# Patient Record
Sex: FEMALE | Race: ASIAN | NOT HISPANIC OR LATINO | Employment: FULL TIME | URBAN - METROPOLITAN AREA
[De-identification: names, ages, dates, MRNs, and addresses within clinical notes are randomized per-mention and may not be internally consistent; named-entity substitution may affect disease eponyms.]

---

## 2022-09-01 ENCOUNTER — APPOINTMENT (OUTPATIENT)
Dept: RADIOLOGY | Facility: MEDICAL CENTER | Age: 47
End: 2022-09-01
Attending: EMERGENCY MEDICINE
Payer: COMMERCIAL

## 2022-09-01 ENCOUNTER — HOSPITAL ENCOUNTER (EMERGENCY)
Facility: MEDICAL CENTER | Age: 47
End: 2022-09-01
Attending: EMERGENCY MEDICINE
Payer: COMMERCIAL

## 2022-09-01 VITALS
BODY MASS INDEX: 19.35 KG/M2 | DIASTOLIC BLOOD PRESSURE: 84 MMHG | RESPIRATION RATE: 16 BRPM | OXYGEN SATURATION: 97 % | SYSTOLIC BLOOD PRESSURE: 118 MMHG | HEIGHT: 61 IN | WEIGHT: 102.51 LBS | TEMPERATURE: 98.1 F | HEART RATE: 80 BPM

## 2022-09-01 DIAGNOSIS — R06.00 DYSPNEA, UNSPECIFIED TYPE: ICD-10-CM

## 2022-09-01 DIAGNOSIS — R00.2 PALPITATIONS: ICD-10-CM

## 2022-09-01 LAB
ALBUMIN SERPL BCP-MCNC: 4.3 G/DL (ref 3.2–4.9)
ALBUMIN/GLOB SERPL: 1.6 G/DL
ALP SERPL-CCNC: 93 U/L (ref 30–99)
ALT SERPL-CCNC: 24 U/L (ref 2–50)
ANION GAP SERPL CALC-SCNC: 10 MMOL/L (ref 7–16)
AST SERPL-CCNC: 22 U/L (ref 12–45)
BASOPHILS # BLD AUTO: 1.2 % (ref 0–1.8)
BASOPHILS # BLD: 0.05 K/UL (ref 0–0.12)
BILIRUB SERPL-MCNC: 0.4 MG/DL (ref 0.1–1.5)
BUN SERPL-MCNC: 11 MG/DL (ref 8–22)
CALCIUM SERPL-MCNC: 9.1 MG/DL (ref 8.4–10.2)
CHLORIDE SERPL-SCNC: 106 MMOL/L (ref 96–112)
CO2 SERPL-SCNC: 24 MMOL/L (ref 20–33)
CREAT SERPL-MCNC: 0.58 MG/DL (ref 0.5–1.4)
D DIMER PPP IA.FEU-MCNC: 3.68 UG/ML (FEU) (ref 0–0.5)
EKG IMPRESSION: NORMAL
EOSINOPHIL # BLD AUTO: 0.26 K/UL (ref 0–0.51)
EOSINOPHIL NFR BLD: 6.2 % (ref 0–6.9)
ERYTHROCYTE [DISTWIDTH] IN BLOOD BY AUTOMATED COUNT: 43.4 FL (ref 35.9–50)
GFR SERPLBLD CREATININE-BSD FMLA CKD-EPI: 111 ML/MIN/1.73 M 2
GLOBULIN SER CALC-MCNC: 2.7 G/DL (ref 1.9–3.5)
GLUCOSE SERPL-MCNC: 106 MG/DL (ref 65–99)
HCT VFR BLD AUTO: 33.3 % (ref 37–47)
HGB BLD-MCNC: 10.7 G/DL (ref 12–16)
IMM GRANULOCYTES # BLD AUTO: 0.02 K/UL (ref 0–0.11)
IMM GRANULOCYTES NFR BLD AUTO: 0.5 % (ref 0–0.9)
LYMPHOCYTES # BLD AUTO: 0.87 K/UL (ref 1–4.8)
LYMPHOCYTES NFR BLD: 20.7 % (ref 22–41)
MCH RBC QN AUTO: 27.2 PG (ref 27–33)
MCHC RBC AUTO-ENTMCNC: 32.1 G/DL (ref 33.6–35)
MCV RBC AUTO: 84.5 FL (ref 81.4–97.8)
MONOCYTES # BLD AUTO: 0.37 K/UL (ref 0–0.85)
MONOCYTES NFR BLD AUTO: 8.8 % (ref 0–13.4)
NEUTROPHILS # BLD AUTO: 2.63 K/UL (ref 2–7.15)
NEUTROPHILS NFR BLD: 62.6 % (ref 44–72)
NRBC # BLD AUTO: 0 K/UL
NRBC BLD-RTO: 0 /100 WBC
PLATELET # BLD AUTO: 337 K/UL (ref 164–446)
PMV BLD AUTO: 8.8 FL (ref 9–12.9)
POTASSIUM SERPL-SCNC: 3.7 MMOL/L (ref 3.6–5.5)
PROT SERPL-MCNC: 7 G/DL (ref 6–8.2)
RBC # BLD AUTO: 3.94 M/UL (ref 4.2–5.4)
SODIUM SERPL-SCNC: 140 MMOL/L (ref 135–145)
TROPONIN T SERPL-MCNC: 9 NG/L (ref 6–19)
WBC # BLD AUTO: 4.2 K/UL (ref 4.8–10.8)

## 2022-09-01 PROCEDURE — 71045 X-RAY EXAM CHEST 1 VIEW: CPT

## 2022-09-01 PROCEDURE — 84484 ASSAY OF TROPONIN QUANT: CPT

## 2022-09-01 PROCEDURE — 700117 HCHG RX CONTRAST REV CODE 255: Performed by: EMERGENCY MEDICINE

## 2022-09-01 PROCEDURE — 36415 COLL VENOUS BLD VENIPUNCTURE: CPT

## 2022-09-01 PROCEDURE — 99284 EMERGENCY DEPT VISIT MOD MDM: CPT

## 2022-09-01 PROCEDURE — 94760 N-INVAS EAR/PLS OXIMETRY 1: CPT

## 2022-09-01 PROCEDURE — 80053 COMPREHEN METABOLIC PANEL: CPT

## 2022-09-01 PROCEDURE — 85379 FIBRIN DEGRADATION QUANT: CPT

## 2022-09-01 PROCEDURE — 71275 CT ANGIOGRAPHY CHEST: CPT

## 2022-09-01 PROCEDURE — 93005 ELECTROCARDIOGRAM TRACING: CPT

## 2022-09-01 PROCEDURE — 93005 ELECTROCARDIOGRAM TRACING: CPT | Performed by: EMERGENCY MEDICINE

## 2022-09-01 PROCEDURE — 85025 COMPLETE CBC W/AUTO DIFF WBC: CPT

## 2022-09-01 RX ORDER — CEPHALEXIN 250 MG/1
250 CAPSULE ORAL EVERY 6 HOURS
COMMUNITY

## 2022-09-01 RX ORDER — HYDROCODONE BITARTRATE AND ACETAMINOPHEN 10; 325 MG/1; MG/1
1 TABLET ORAL EVERY 6 HOURS PRN
COMMUNITY
Start: 2022-08-15

## 2022-09-01 RX ORDER — DOCUSATE SODIUM 100 MG/1
100 CAPSULE, LIQUID FILLED ORAL 2 TIMES DAILY
COMMUNITY

## 2022-09-01 RX ORDER — LORAZEPAM 1 MG/1
1 TABLET ORAL
COMMUNITY
Start: 2022-08-15

## 2022-09-01 RX ORDER — ACETAMINOPHEN 500 MG
500-1000 TABLET ORAL EVERY 6 HOURS PRN
COMMUNITY

## 2022-09-01 RX ADMIN — IOHEXOL 65 ML: 350 INJECTION, SOLUTION INTRAVENOUS at 13:46

## 2022-09-01 NOTE — ED NOTES
Med rec completed per pt   Allergies reviewed    Pt completed a 4 day course of Keflex about 1 week ago

## 2022-09-01 NOTE — DISCHARGE INSTRUCTIONS
Follow-up with primary care and surgeon 1 to 2 days for reevaluation.    Continue home medications as previously indicated.    Encourage oral fluid hydration.  Diet as tolerated.    Activities recommended postoperatively.    Return to the emergency department for recurrent or intractable chest pain, shortness of breath, rapid heartbeat, syncope, fever, wound infection or other new concerns.

## 2022-09-01 NOTE — ED PROVIDER NOTES
ED Provider Note    CHIEF COMPLAINT  Chief Complaint   Patient presents with    Shortness of Breath    Anxiety       HPI  Saida Landon is a 46 y.o. female who presents to the emergency department through triage with her  for shortness of breath, tachycardia.  Patient states she had VASER liposuction approximately 10 days ago in [place].  Has been recovering well.  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  Worked overnight last night, arrived home this morning and took off her compressive dressing to wash it.  She felt a warmness developed in her stomach and chest, then felt short of breath. Palpitations, noticed her heart rate was 140.  No cough or congestion.  No pleuritic pain.  No fever or chills.  No evidence for wound infection.    Initially with 3 CANDY drains, now down to 1.  Had follow-up with a doctor's office 2 days ago.    Patient is unsure if she had anxiety attack, but does not have history of the same.  Symptoms are improving upon arrival but not resolved.  No extremity swelling or discoloration or discomfort.    REVIEW OF SYSTEMS  See HPI for further details. All other systems are negative.     PAST MEDICAL HISTORY  Denies    SOCIAL HISTORY  Social History     Tobacco Use    Smoking status: Not on file    Smokeless tobacco: Not on file   Substance and Sexual Activity    Alcohol use: Not on file    Drug use: Not on file    Sexual activity: Not on file       SURGICAL HISTORY   has a past surgical history that includes liposuction.    CURRENT MEDICATIONS  Home Medications       Reviewed by Ran Fabian (Pharmacy Tech) on 09/01/22 at 1223  Med List Status: Complete     Medication Last Dose Status   acetaminophen (TYLENOL) 500 MG Tab 9/1/2022 Active   cephALEXin (KEFLEX) 250 MG Cap COMPLETED Active   docusate sodium (COLACE) 100 MG Cap 8/31/2022 Active   HYDROcodone/acetaminophen (NORCO)  MG Tab 8/30/2022 Active   LORazepam (ATIVAN) 1 MG Tab 9/1/2022 Active  "                   ALLERGIES  Allergies   Allergen Reactions    Fish Hives     Not allergic to shell fish        PHYSICAL EXAM  VITAL SIGNS: /84   Pulse 80   Temp 36.7 °C (98.1 °F) (Temporal)   Resp 16   Ht 1.549 m (5' 1\")   Wt 46.5 kg (102 lb 8.2 oz)   SpO2 97%   BMI 19.37 kg/m²   Pulse ox interpretation: I interpret this pulse ox as normal.  Constitutional: Alert in no apparent distress.  HENT: Normocephalic, atraumatic. Bilateral external ears normal, Nose normal. Moist mucous membranes.    Eyes: Pupils are equal and reactive, Conjunctiva normal.   Neck: Normal range of motion, Supple.  No JVD.  No stridor or dysphonia.  Lymphatic: No lymphadenopathy noted.   Cardiovascular: Mild tachycardia otherwise regular rate and rhythm, no murmurs. Distal pulses intact.  No peripheral edema.  No unilateral pain, swelling or discoloration.  Thorax & Lungs: Normal breath sounds.  No wheezing/rales/ronchi. No increased work of breathing, clipped speech or retractions.   Abdomen: Soft, non-distended.  Small abdominal and groin wound incisions are clean and dry.  Left groin with CANDY drain with minimal output.  Subacute ecchymosis in the left upper quadrant.  Mild diffuse tenderness without guarding or peritonitis.  Skin: Warm, Dry, No erythema, No rash.   Musculoskeletal: Good range of motion in all major joints.   Neurologic: Alert , no gross focal deficit noted.  Psychiatric: Affect normal, Judgment normal, Mood normal.       DIAGNOSTIC STUDIES / PROCEDURES    LABS  Results for orders placed or performed during the hospital encounter of 09/01/22   CBC w/ Differential   Result Value Ref Range    WBC 4.2 (L) 4.8 - 10.8 K/uL    RBC 3.94 (L) 4.20 - 5.40 M/uL    Hemoglobin 10.7 (L) 12.0 - 16.0 g/dL    Hematocrit 33.3 (L) 37.0 - 47.0 %    MCV 84.5 81.4 - 97.8 fL    MCH 27.2 27.0 - 33.0 pg    MCHC 32.1 (L) 33.6 - 35.0 g/dL    RDW 43.4 35.9 - 50.0 fL    Platelet Count 337 164 - 446 K/uL    MPV 8.8 (L) 9.0 - 12.9 fL    " Neutrophils-Polys 62.60 44.00 - 72.00 %    Lymphocytes 20.70 (L) 22.00 - 41.00 %    Monocytes 8.80 0.00 - 13.40 %    Eosinophils 6.20 0.00 - 6.90 %    Basophils 1.20 0.00 - 1.80 %    Immature Granulocytes 0.50 0.00 - 0.90 %    Nucleated RBC 0.00 /100 WBC    Neutrophils (Absolute) 2.63 2.00 - 7.15 K/uL    Lymphs (Absolute) 0.87 (L) 1.00 - 4.80 K/uL    Monos (Absolute) 0.37 0.00 - 0.85 K/uL    Eos (Absolute) 0.26 0.00 - 0.51 K/uL    Baso (Absolute) 0.05 0.00 - 0.12 K/uL    Immature Granulocytes (abs) 0.02 0.00 - 0.11 K/uL    NRBC (Absolute) 0.00 K/uL   Complete Metabolic Panel (CMP)   Result Value Ref Range    Sodium 140 135 - 145 mmol/L    Potassium 3.7 3.6 - 5.5 mmol/L    Chloride 106 96 - 112 mmol/L    Co2 24 20 - 33 mmol/L    Anion Gap 10.0 7.0 - 16.0    Glucose 106 (H) 65 - 99 mg/dL    Bun 11 8 - 22 mg/dL    Creatinine 0.58 0.50 - 1.40 mg/dL    Calcium 9.1 8.4 - 10.2 mg/dL    AST(SGOT) 22 12 - 45 U/L    ALT(SGPT) 24 2 - 50 U/L    Alkaline Phosphatase 93 30 - 99 U/L    Total Bilirubin 0.4 0.1 - 1.5 mg/dL    Albumin 4.3 3.2 - 4.9 g/dL    Total Protein 7.0 6.0 - 8.2 g/dL    Globulin 2.7 1.9 - 3.5 g/dL    A-G Ratio 1.6 g/dL   Troponin STAT   Result Value Ref Range    Troponin T 9 6 - 19 ng/L   D-Dimer (only helpful in low pre-test probability wells critieria. Do not order if patient ruled out by PERC criteria. See Weblinks at top of Labs section)   Result Value Ref Range    D-Dimer Screen 3.68 (H) 0.00 - 0.50 ug/mL (FEU)   ESTIMATED GFR   Result Value Ref Range    GFR (CKD-EPI) 111 >60 mL/min/1.73 m 2   EKG   Result Value Ref Range    Report       [facility] Emergency Dept.    Test Date:  2022  Pt Name:    KAY JADEN        Department: [facility]  MRN:        [id number]                      Room:  Gender:     Female                       Technician: [id number]  :        1975                   Requested By:ER TRIAGE PROTOCOL  Order #:    [id number]                    Amado MD:  BASIM GOMEZ DO    Measurements  Intervals                                Axis  Rate:       86                           P:          77  IA:         160                          QRS:        78  QRSD:       83                           T:          26  QT:         362  QTc:        433    Interpretive Statements  Sinus rhythm  Borderline T abnormalities, anterior leads  No previous ECG available for comparison  Electronically Signed On 9-1-2022 13:19:54 PDT by BASIM GOMEZ DO           RADIOLOGY  CT-CTA CHEST PULMONARY ARTERY W/ RECONS   Final Result      No pulmonary embolus identified            DX-CHEST-PORTABLE (1 VIEW)   Final Result      No evidence of acute cardiopulmonary process.          COURSE & MEDICAL DECISION MAKING  Nursing notes and vital signs were reviewed. (See chart for details)  The patients  records were reviewed, history was obtained from the patient;     Seen evaluated at bedside.  Mild persistent tachycardia, 90s to 104 during my evaluation.  Normotensive.  No acute respiratory distress.  Lung sounds are clear to auscultation bilaterally.  No clinical sequela for DVT however patient is postoperative.  Add labs, include D-dimer, chest x-ray.    1:20 PM reevaluated at bedside.  No acute respiratory distress.  Symptoms have nearly resolved.  Heart rate 80s.  Normotensive.  No hypoxia.  Labs are within normal limits however D-dimer is elevated.  EKG within normal limits, no evidence for STEMI or other ischemia.  Continuous telemetry thus far without ectopy or arrhythmia.  Add CTA to exclude PE.    2:08 PM CT negative for PE or other acute pathology.  Patient is resting comfortably, symptoms have resolved.  Hemodynamically stable without persistent tachycardia, she was never febrile, hypotensive or hypoxic.  No acute respiratory distress.  Nonspecific dyspnea, palpitations resolved spontaneously.  She has a rest of the weekend off we will follow-up with her surgeon and primary care for  reevaluation.    Patient is stable for discharge at this time, anticipatory guidance provided, continue home medications as previously indicated, close follow-up is encouraged, and strict ED return instructions have been detailed. Patient is agreeable to the disposition and plan.    FINAL IMPRESSION  (R00.2) Palpitations  (R06.00) Dyspnea, unspecified type      Electronically signed by: Ayaka Ferrer D.O., 9/1/2022 1:17 PM      This dictation was created using voice recognition software. The accuracy of the dictation is limited to the abilities of the software. I expect there may be some errors of grammar and possibly content. The nursing notes were reviewed and certain aspects of this information were incorporated into this note.

## 2022-09-01 NOTE — ED TRIAGE NOTES
Pt amb to triage c/o sob and incr anxiety since yesterday. Pt had a liposuction proc to abd and gurinder flanks x4d ago

## 2022-09-01 NOTE — ED NOTES
Pt given d/c paperwork, pt verbalized understanding all information given. Pt ambulated out of the ER w/o difficulty

## 2024-05-22 ENCOUNTER — HOSPITAL ENCOUNTER (OUTPATIENT)
Facility: MEDICAL CENTER | Age: 49
End: 2024-05-22
Attending: INTERNAL MEDICINE
Payer: COMMERCIAL

## 2024-05-23 LAB
ESTRADIOL SERPL-MCNC: <5 PG/ML
FSH SERPL-ACNC: 118 MIU/ML

## 2024-11-26 ENCOUNTER — HOSPITAL ENCOUNTER (EMERGENCY)
Facility: MEDICAL CENTER | Age: 49
End: 2024-11-27
Attending: STUDENT IN AN ORGANIZED HEALTH CARE EDUCATION/TRAINING PROGRAM
Payer: COMMERCIAL

## 2024-11-26 DIAGNOSIS — R42 VERTIGO: Primary | ICD-10-CM

## 2024-11-26 DIAGNOSIS — G43.809 OTHER MIGRAINE WITHOUT STATUS MIGRAINOSUS, NOT INTRACTABLE: ICD-10-CM

## 2024-11-26 LAB
BASOPHILS # BLD AUTO: 0.5 % (ref 0–1.8)
BASOPHILS # BLD: 0.03 K/UL (ref 0–0.12)
EOSINOPHIL # BLD AUTO: 0.12 K/UL (ref 0–0.51)
EOSINOPHIL NFR BLD: 2 % (ref 0–6.9)
ERYTHROCYTE [DISTWIDTH] IN BLOOD BY AUTOMATED COUNT: 37.9 FL (ref 35.9–50)
GLUCOSE BLD STRIP.AUTO-MCNC: 111 MG/DL (ref 65–99)
HCG SERPL QL: NEGATIVE
HCT VFR BLD AUTO: 39.5 % (ref 37–47)
HGB BLD-MCNC: 13.3 G/DL (ref 12–16)
IMM GRANULOCYTES # BLD AUTO: 0.01 K/UL (ref 0–0.11)
IMM GRANULOCYTES NFR BLD AUTO: 0.2 % (ref 0–0.9)
LYMPHOCYTES # BLD AUTO: 1.49 K/UL (ref 1–4.8)
LYMPHOCYTES NFR BLD: 24.8 % (ref 22–41)
MCH RBC QN AUTO: 29.3 PG (ref 27–33)
MCHC RBC AUTO-ENTMCNC: 33.7 G/DL (ref 32.2–35.5)
MCV RBC AUTO: 87 FL (ref 81.4–97.8)
MONOCYTES # BLD AUTO: 0.39 K/UL (ref 0–0.85)
MONOCYTES NFR BLD AUTO: 6.5 % (ref 0–13.4)
NEUTROPHILS # BLD AUTO: 3.98 K/UL (ref 1.82–7.42)
NEUTROPHILS NFR BLD: 66 % (ref 44–72)
NRBC # BLD AUTO: 0 K/UL
NRBC BLD-RTO: 0 /100 WBC (ref 0–0.2)
PLATELET # BLD AUTO: 217 K/UL (ref 164–446)
PMV BLD AUTO: 9.4 FL (ref 9–12.9)
RBC # BLD AUTO: 4.54 M/UL (ref 4.2–5.4)
WBC # BLD AUTO: 6 K/UL (ref 4.8–10.8)

## 2024-11-26 PROCEDURE — 96375 TX/PRO/DX INJ NEW DRUG ADDON: CPT

## 2024-11-26 PROCEDURE — 96374 THER/PROPH/DIAG INJ IV PUSH: CPT

## 2024-11-26 PROCEDURE — 93005 ELECTROCARDIOGRAM TRACING: CPT

## 2024-11-26 PROCEDURE — 84703 CHORIONIC GONADOTROPIN ASSAY: CPT

## 2024-11-26 PROCEDURE — 85025 COMPLETE CBC W/AUTO DIFF WBC: CPT

## 2024-11-26 PROCEDURE — 99284 EMERGENCY DEPT VISIT MOD MDM: CPT

## 2024-11-26 PROCEDURE — 36415 COLL VENOUS BLD VENIPUNCTURE: CPT

## 2024-11-26 PROCEDURE — 83690 ASSAY OF LIPASE: CPT

## 2024-11-26 PROCEDURE — 82962 GLUCOSE BLOOD TEST: CPT

## 2024-11-26 PROCEDURE — 93005 ELECTROCARDIOGRAM TRACING: CPT | Performed by: STUDENT IN AN ORGANIZED HEALTH CARE EDUCATION/TRAINING PROGRAM

## 2024-11-26 PROCEDURE — 80053 COMPREHEN METABOLIC PANEL: CPT

## 2024-11-26 PROCEDURE — 700111 HCHG RX REV CODE 636 W/ 250 OVERRIDE (IP): Performed by: STUDENT IN AN ORGANIZED HEALTH CARE EDUCATION/TRAINING PROGRAM

## 2024-11-26 RX ORDER — ONDANSETRON 2 MG/ML
4 INJECTION INTRAMUSCULAR; INTRAVENOUS ONCE
Status: COMPLETED | OUTPATIENT
Start: 2024-11-27 | End: 2024-11-26

## 2024-11-26 RX ORDER — KETOROLAC TROMETHAMINE 15 MG/ML
15 INJECTION, SOLUTION INTRAMUSCULAR; INTRAVENOUS ONCE
Status: COMPLETED | OUTPATIENT
Start: 2024-11-27 | End: 2024-11-27

## 2024-11-26 RX ORDER — DIAZEPAM 10 MG/2ML
2.5 INJECTION, SOLUTION INTRAMUSCULAR; INTRAVENOUS ONCE
Status: COMPLETED | OUTPATIENT
Start: 2024-11-27 | End: 2024-11-26

## 2024-11-26 RX ORDER — ACETAMINOPHEN 10 MG/ML
1000 INJECTION, SOLUTION INTRAVENOUS ONCE
Status: COMPLETED | OUTPATIENT
Start: 2024-11-27 | End: 2024-11-27

## 2024-11-26 RX ADMIN — ONDANSETRON 4 MG: 2 INJECTION INTRAMUSCULAR; INTRAVENOUS at 23:55

## 2024-11-26 RX ADMIN — DIAZEPAM 2.5 MG: 10 INJECTION, SOLUTION INTRAMUSCULAR; INTRAVENOUS at 23:55

## 2024-11-26 NOTE — Clinical Note
Saida Barbi was seen and treated in our emergency department on 11/26/2024.  She may return to work on 11/30/2024.       If you have any questions or concerns, please don't hesitate to call.      Kailey Jade M.D.

## 2024-11-27 VITALS
HEART RATE: 77 BPM | DIASTOLIC BLOOD PRESSURE: 59 MMHG | HEIGHT: 61 IN | TEMPERATURE: 98.2 F | WEIGHT: 109 LBS | SYSTOLIC BLOOD PRESSURE: 101 MMHG | BODY MASS INDEX: 20.58 KG/M2 | RESPIRATION RATE: 19 BRPM | OXYGEN SATURATION: 96 %

## 2024-11-27 LAB
ALBUMIN SERPL BCP-MCNC: 4.3 G/DL (ref 3.2–4.9)
ALBUMIN/GLOB SERPL: 1.4 G/DL
ALP SERPL-CCNC: 96 U/L (ref 30–99)
ALT SERPL-CCNC: 20 U/L (ref 2–50)
ANION GAP SERPL CALC-SCNC: 13 MMOL/L (ref 7–16)
AST SERPL-CCNC: 27 U/L (ref 12–45)
BILIRUB SERPL-MCNC: 0.3 MG/DL (ref 0.1–1.5)
BUN SERPL-MCNC: 18 MG/DL (ref 8–22)
CALCIUM ALBUM COR SERPL-MCNC: 8.6 MG/DL (ref 8.5–10.5)
CALCIUM SERPL-MCNC: 8.8 MG/DL (ref 8.5–10.5)
CHLORIDE SERPL-SCNC: 102 MMOL/L (ref 96–112)
CO2 SERPL-SCNC: 24 MMOL/L (ref 20–33)
CREAT SERPL-MCNC: 0.72 MG/DL (ref 0.5–1.4)
EKG IMPRESSION: NORMAL
GFR SERPLBLD CREATININE-BSD FMLA CKD-EPI: 101 ML/MIN/1.73 M 2
GLOBULIN SER CALC-MCNC: 3.1 G/DL (ref 1.9–3.5)
GLUCOSE SERPL-MCNC: 117 MG/DL (ref 65–99)
LIPASE SERPL-CCNC: 65 U/L (ref 11–82)
POTASSIUM SERPL-SCNC: 3.5 MMOL/L (ref 3.6–5.5)
PROT SERPL-MCNC: 7.4 G/DL (ref 6–8.2)
SODIUM SERPL-SCNC: 139 MMOL/L (ref 135–145)

## 2024-11-27 PROCEDURE — 96375 TX/PRO/DX INJ NEW DRUG ADDON: CPT

## 2024-11-27 PROCEDURE — 700111 HCHG RX REV CODE 636 W/ 250 OVERRIDE (IP): Mod: JZ | Performed by: STUDENT IN AN ORGANIZED HEALTH CARE EDUCATION/TRAINING PROGRAM

## 2024-11-27 RX ORDER — SUMATRIPTAN 50 MG/1
50 TABLET, FILM COATED ORAL
Qty: 10 TABLET | Refills: 0 | Status: SHIPPED | OUTPATIENT
Start: 2024-11-27

## 2024-11-27 RX ORDER — DIAZEPAM 2 MG/1
2 TABLET ORAL EVERY 12 HOURS PRN
Qty: 6 TABLET | Refills: 0 | Status: SHIPPED | OUTPATIENT
Start: 2024-11-27 | End: 2024-11-30

## 2024-11-27 RX ORDER — DIAZEPAM 2 MG/1
2 TABLET ORAL EVERY 12 HOURS PRN
Qty: 6 TABLET | Refills: 0 | Status: SHIPPED | OUTPATIENT
Start: 2024-11-27 | End: 2024-11-27

## 2024-11-27 RX ADMIN — KETOROLAC TROMETHAMINE 15 MG: 15 INJECTION, SOLUTION INTRAMUSCULAR; INTRAVENOUS at 00:02

## 2024-11-27 RX ADMIN — ACETAMINOPHEN 1000 MG: 1000 INJECTION INTRAVENOUS at 00:02

## 2024-11-27 NOTE — ED TRIAGE NOTES
Saida Landon  49 y.o. female    Chief Complaint   Patient presents with    Dizziness    N/V     Pt states she was doing her med pass at work when she started having dizziness, feeling like the world was spinning and then had an episode of vomiting, came down and vomited one more time. States hx of migraines and vertigo, not taking meds and states this is different than normal. .    Vitals:    11/26/24 2254   BP: (!) 152/104   Pulse: 77   Resp: (!) 24   Temp: 35.9 °C (96.7 °F)   SpO2: 94%       Triage process explained to patient, apologized for wait time, and returned to lobby.  Pt informed to notify staff of any change in condition.

## 2024-11-27 NOTE — ED PROVIDER NOTES
ED Provider Note    CHIEF COMPLAINT  Chief Complaint   Patient presents with    Dizziness    N/V       EXTERNAL RECORDS REVIEWED  Outpatient Notes patient follows with her primary care doctor and was seen November 5, 2024 for follow-up exam.    HPI/ROS  LIMITATION TO HISTORY   Select: : None  OUTSIDE HISTORIAN(S):  None    Saida Landon is a 49 y.o. female with history of migraine and vertigo who presents for evaluation of throbbing, migraine type headache that started 9 PM.  Symptoms started while she was at work.  They are gradual in onset.  She is also having room spinning dizziness.  Symptoms are worse if she moves her head.  They go away when she is sitting completely still.  There is no sudden onset of symptoms.  She denies any fever, neck pain, head trauma.  She has no numbness, tingling, weakness.  She notes that she has a history of migraine and vertigo.  She is previously on Imitrex and Valium however has not taken this in 4 years.  She states that the symptoms feel exactly the same day when she had the symptoms in the past.  She notes that she has a history of DCIS and she underwent radiation therapy.  She is now on tamoxifen.  The only medication that she takes is tamoxifen.    PAST MEDICAL HISTORY   She has history of migraine, vertigo, DCIS    SURGICAL HISTORY   has a past surgical history that includes liposuction.    FAMILY HISTORY  History reviewed. No pertinent family history.    SOCIAL HISTORY  Social History     Tobacco Use    Smoking status: Never    Smokeless tobacco: Never   Substance and Sexual Activity    Alcohol use: Not Currently    Drug use: Never    Sexual activity: Not on file       CURRENT MEDICATIONS  Home Medications       Reviewed by Luci Davis R.N. (Registered Nurse) on 11/26/24 at 2306  Med List Status: Not Addressed     Medication Last Dose Status   acetaminophen (TYLENOL) 500 MG Tab  Active   cephALEXin (KEFLEX) 250 MG Cap  Active   docusate sodium  "(COLACE) 100 MG Cap  Active   HYDROcodone/acetaminophen (NORCO)  MG Tab  Active   LORazepam (ATIVAN) 1 MG Tab  Active                    ALLERGIES  Allergies   Allergen Reactions    Fish Hives     Not allergic to shell fish        PHYSICAL EXAM  VITAL SIGNS: BP (!) 152/104   Pulse 77   Temp 35.9 °C (96.7 °F) (Temporal)   Resp (!) 24   Ht 1.549 m (5' 1\")   Wt 49.4 kg (109 lb)   SpO2 94%   BMI 20.60 kg/m²      Constitutional: Laying in bed, eyes closed, laying on right side  HEENT: Normocephalic, Atraumatic,  external ears normal, pharynx pink,  Mucous  Membranes moist, No rhinorrhea or mucosal edema  Eyes: PERRL, EOMI, Conjunctiva normal, No discharge.   Neck: Normal range of motion, No tenderness, Supple, No stridor.   Cardiovascular: Regular Rate and Rhythm, No murmurs,  rubs, or gallops.   Thorax & Lungs: Lungs clear to auscultation bilaterally, No respiratory distress, No wheezes, rhales or rhonchi, No chest wall tenderness.   Abdomen: Bowel sounds normal, Soft, non tender, non distended,  No pulsatile masses., no rebound guarding or peritoneal signs.   Skin: Warm, Dry, No erythema, No rash,   Back:  No CVA tenderness,  No spinal tenderness, bony crepitance step offs or instability.   Extremities: Equal, intact distal pulses, No cyanosis, clubbing or edema,  No tenderness.   Musculoskeletal: Good range of motion in all major joints. No tenderness to palpation or major deformities noted.   Neurologic: Alert and oriented, Symmetric smile, eyes shut tight bilaterally, forehead wrinkles bilaterally, sensation intact to light touch bilateral face, tongue midline, head turn and shoulder shrug with full strength. Hearing intact grossly bilaterally. 5/5 strength shoulder, elbow  b/l. 5/5 strength hip, knee, ankle b/l   SILT biceps, forearms, hands, SILT thighs, shins mid foot   NO pronator drift, negative romberg, no aphasia, no dysarthria, no gaze preference or visual deficit   Psychiatric: Affect " normal, Judgment normal, Mood normal.      EKG/LABS  Results for orders placed or performed during the hospital encounter of 24   EKG    Collection Time: 24 11:04 PM   Result Value Ref Range    Report       [facility] Emergency Dept.    Test Date:  2024  Pt Name:    KAY STANLEY        Department: ER  MRN:        [id number]                      Room:  Gender:     Female                       Technician: [id number]  :        1975                   Requested By:ER TRIAGE PROTOCOL  Order #:    [id number]                    Reading MD: Kailey Jade    Measurements  Intervals                                Axis  Rate:       74                           P:          55  SC:         176                          QRS:        75  QRSD:       74                           T:          60  QT:         391  QTc:        434    Interpretive Statements  Sinus rhythm  Normal axis  Normal intervals  No ST changes  Compared to ECG 2022 11:12:33  T-wave abnormality no longer present  Electronically Signed On 2024 06:32:33 PST by Kailey Jade     POCT glucose device results    Collection Time: 24 11:09 PM   Result Value Ref Range    POC Glucose, Blood 111 (H) 65 - 99 mg/dL   CBC WITH DIFFERENTIAL    Collection Time: 24 11:21 PM   Result Value Ref Range    WBC 6.0 4.8 - 10.8 K/uL    RBC 4.54 4.20 - 5.40 M/uL    Hemoglobin 13.3 12.0 - 16.0 g/dL    Hematocrit 39.5 37.0 - 47.0 %    MCV 87.0 81.4 - 97.8 fL    MCH 29.3 27.0 - 33.0 pg    MCHC 33.7 32.2 - 35.5 g/dL    RDW 37.9 35.9 - 50.0 fL    Platelet Count 217 164 - 446 K/uL    MPV 9.4 9.0 - 12.9 fL    Neutrophils-Polys 66.00 44.00 - 72.00 %    Lymphocytes 24.80 22.00 - 41.00 %    Monocytes 6.50 0.00 - 13.40 %    Eosinophils 2.00 0.00 - 6.90 %    Basophils 0.50 0.00 - 1.80 %    Immature Granulocytes 0.20 0.00 - 0.90 %    Nucleated RBC 0.00 0.00 - 0.20 /100 WBC    Neutrophils (Absolute) 3.98 1.82 - 7.42 K/uL    Lymphs  (Absolute) 1.49 1.00 - 4.80 K/uL    Monos (Absolute) 0.39 0.00 - 0.85 K/uL    Eos (Absolute) 0.12 0.00 - 0.51 K/uL    Baso (Absolute) 0.03 0.00 - 0.12 K/uL    Immature Granulocytes (abs) 0.01 0.00 - 0.11 K/uL    NRBC (Absolute) 0.00 K/uL   COMP METABOLIC PANEL    Collection Time: 11/26/24 11:21 PM   Result Value Ref Range    Sodium 139 135 - 145 mmol/L    Potassium 3.5 (L) 3.6 - 5.5 mmol/L    Chloride 102 96 - 112 mmol/L    Co2 24 20 - 33 mmol/L    Anion Gap 13.0 7.0 - 16.0    Glucose 117 (H) 65 - 99 mg/dL    Bun 18 8 - 22 mg/dL    Creatinine 0.72 0.50 - 1.40 mg/dL    Calcium 8.8 8.5 - 10.5 mg/dL    Correct Calcium 8.6 8.5 - 10.5 mg/dL    AST(SGOT) 27 12 - 45 U/L    ALT(SGPT) 20 2 - 50 U/L    Alkaline Phosphatase 96 30 - 99 U/L    Total Bilirubin 0.3 0.1 - 1.5 mg/dL    Albumin 4.3 3.2 - 4.9 g/dL    Total Protein 7.4 6.0 - 8.2 g/dL    Globulin 3.1 1.9 - 3.5 g/dL    A-G Ratio 1.4 g/dL   LIPASE    Collection Time: 11/26/24 11:21 PM   Result Value Ref Range    Lipase 65 11 - 82 U/L   HCG QUAL SERUM    Collection Time: 11/26/24 11:21 PM   Result Value Ref Range    Beta-Hcg Qualitative Serum Negative Negative   ESTIMATED GFR    Collection Time: 11/26/24 11:21 PM   Result Value Ref Range    GFR (CKD-EPI) 101 >60 mL/min/1.73 m 2       I have independently interpreted this EKG    COURSE & MEDICAL DECISION MAKING    ASSESSMENT, COURSE AND PLAN  Care Narrative:   This is a 49-year-old female with history of migraine and vertigo is presenting for evaluation of throbbing headache consistent with previous migraines with associated vertigo.  Patient has longstanding history of this.  She has not had an attack in approximately 3 years.  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  Symptoms were gradual.  Symptoms completely resolved when she holds her head in a certain position.  Symptoms are consistent with BPPV.  I suspect that she likely has a migraine headache as well.  I did consider obtaining CT imaging  of her head but she has no thunderclap headache and I doubt subarachnoid hemorrhage.  There is no history of trauma.  I doubt posterior circulation CVA given her lack of risk factors and the positional nature of her symptoms.  She has no other neurologic deficits on exam.    Labs are obtained there is no leukocytosis.  Electrolytes within normal limits.  EKG with no evidence of arrhythmia.  She is given Valium, Toradol, Tylenol as well as Zofran.  On reassessment, symptoms have completely resolved.  Patient will be discharged home at this time.  I recommend that she follow-up with her primary care doctor.  I have refilled her Imitrex as well as small amount of Valium if she has repeat symptoms.  She is instructed return for any worsening symptoms, fever, any other concerns.  Patient is agreeable to discharge plan with no further questions.    Narcotics Script: In prescribing controlled substances to this patient, I certify that I have obtained and reviewed the medical history of Saida Landon. I have also made a good rosie effort to obtain applicable records from other providers who have treated the patient and records did not demonstrate any increased risk of substance abuse that would prevent me from prescribing controlled substances.     I have conducted a physical exam and documented it. I have reviewed Ms. Landon’s prescription history as maintained by the [place] Prescription Monitoring Program.     I have assessed the patient’s risk for abuse, dependency, and addiction using the validated Opioid Risk Tool available at https://www.mdcalc.com/rndktl-sdcb-wjxs-ort-narcotic-abuse.     Given the above, I believe the benefits of controlled substance therapy outweigh the risks. The reasons for prescribing controlled substances include in my professional opinion, controlled substances are the only reasonable choice for this patient because she has previously had good response to Valium .  Accordingly, I have discussed the risk and benefits, treatment plan, and alternative therapies with the patient.           ADDITIONAL PROBLEMS MANAGED  None    DISPOSITION AND DISCUSSIONS  I have discussed management of the patient with the following physicians and ELI's:  None    Discussion of management with other [facility] or appropriate source(s): None     Escalation of care considered, and ultimately not performed:diagnostic imaging    Barriers to care at this time, including but not limited to:  None .     Decision tools and prescription drugs considered including, but not limited to:  None .    The patient will return for new or worsening symptoms and is stable at the time of discharge.    The patient is referred to a primary physician for blood pressure management, diabetic screening, and for all other preventative health concerns.      DISPOSITION:  Patient will be discharged home in stable condition.    FOLLOW UP:  Bahman Cotton M.D.  [address] [place] [id number] [phone]          [facility], Emergency Dept  [address] [facility] [id number] [phone]          OUTPATIENT MEDICATIONS:  Discharge Medication List as of 11/27/2024  4:19 AM        START taking these medications    Details   SUMAtriptan (IMITREX) 50 MG Tab Take 1 Tablet by mouth one time as needed for Migraine for up to 1 dose., Disp-10 Tablet, R-0, Normal               FINAL DIAGNOSIS  1. Vertigo Acute   2. Other migraine without status migrainosus, not intractable Acute        Electronically signed by: Kailey Jade M.D., 11/26/2024 11:31 PM

## 2024-11-27 NOTE — ED NOTES
Taken patient from triage waiting room, per WC alert/ oriented x 4.Verified patient identification.  Assumed patient care.   Placed on patient room. Changed clothes to hospital gown. Connected to cardiac monitor.   Given the call light and instructed to call for any assistance needed/ or concerns.   Bed on lowest position, side rails up, breaks locked. Awaiting for ERP.

## 2024-11-27 NOTE — ED NOTES
Pt discharged to home. Discharge paperwork provided. Education provided by ERP. Reinforced discharge instructions.  Pt was given follow up instructions and prescriptions.  Pt verbalized understanding of all instructions for discharge.

## 2024-11-27 NOTE — DISCHARGE INSTRUCTIONS
You were seen for evaluation of vertigo, headache.  Your lab tests are all reassuring.  I have refilled your Imitrex as well as Valium.  Please schedule follow-up point with your primary care doctor.  Return if you have any worsening symptoms, fever, any change in your symptoms or any other concerns.

## 2024-11-27 NOTE — ED NOTES
Checked on bed, connected to monitor, asleep with unlabored respirations. Vital signs is stable.   Denied any new complaints. No current needs identified.  Gurney in low position, side rail up for pt safety. Call light within reach.